# Patient Record
Sex: MALE | Race: WHITE | Employment: FULL TIME | URBAN - METROPOLITAN AREA
[De-identification: names, ages, dates, MRNs, and addresses within clinical notes are randomized per-mention and may not be internally consistent; named-entity substitution may affect disease eponyms.]

---

## 2017-02-21 PROBLEM — I49.8 OTHER CARDIAC ARRHYTHMIA: Status: ACTIVE | Noted: 2017-02-21

## 2017-02-21 PROBLEM — Z98.61 POSTSURGICAL PERCUTANEOUS TRANSLUMINAL CORONARY ANGIOPLASTY (PTCA) STATUS: Status: ACTIVE | Noted: 2017-02-21

## 2018-01-30 ENCOUNTER — APPOINTMENT (OUTPATIENT)
Dept: GENERAL RADIOLOGY | Age: 58
End: 2018-01-30
Attending: EMERGENCY MEDICINE
Payer: COMMERCIAL

## 2018-01-30 ENCOUNTER — HOSPITAL ENCOUNTER (EMERGENCY)
Age: 58
Discharge: HOME | End: 2018-01-30
Attending: EMERGENCY MEDICINE
Payer: COMMERCIAL

## 2018-01-30 VITALS
BODY MASS INDEX: 26.57 KG/M2 | SYSTOLIC BLOOD PRESSURE: 160 MMHG | OXYGEN SATURATION: 97 % | WEIGHT: 225 LBS | TEMPERATURE: 98 F | HEIGHT: 77 IN | HEART RATE: 75 BPM | RESPIRATION RATE: 18 BRPM | DIASTOLIC BLOOD PRESSURE: 98 MMHG

## 2018-01-30 DIAGNOSIS — B34.9 VIRAL SYNDROME: Primary | ICD-10-CM

## 2018-01-30 PROCEDURE — 99283 EMERGENCY DEPT VISIT LOW MDM: CPT

## 2018-01-30 PROCEDURE — 71046 X-RAY EXAM CHEST 2 VIEWS: CPT | Performed by: EMERGENCY MEDICINE

## 2018-01-30 NOTE — ED PROVIDER NOTES
Patient Seen in: THE [facility] Emergency Department In [place]    History   Patient presents with:  Cough/URI    Stated Complaint: cough - sinus pressure     HPI    Patient is a pleasant 65-year-old male, presenting for evaluation of cough.   Symptoms have be HEENT: Head is without evidence of trauma. Extraocular muscles are intact. Pupils are equal and reactive to light. Oropharynx is pink and moist.  NECK: Neck is supple and nontender. The trachea is midline.    LUNGS: Lungs are clear to auscultation bilater with viral syndrome, possibly influenza. Supportive care instructions outlined. Patient should rest and push oral fluids. Motrin and Tylenol should be taken as needed, as directed. Patient should follow closely with his primary care physician.   Yu

## (undated) NOTE — ED AVS SNAPSHOT
Nandini Lagunas   MRN: [id number]    Department:  THE [facility] Emergency Department in [place]   Date of Visit:  1/30/2018           Disclosure     Insurance plans vary and the physician(s) referred by the ER may not be covered by your plan.  Please contact yo tell this physician (or your personal doctor if your instructions are to return to your personal doctor) about any new or lasting problems. The primary care or specialist physician will see patients referred from the [facility] Emergency Department.  Maurisio Mendes